# Patient Record
Sex: FEMALE | NOT HISPANIC OR LATINO | Employment: FULL TIME | URBAN - METROPOLITAN AREA
[De-identification: names, ages, dates, MRNs, and addresses within clinical notes are randomized per-mention and may not be internally consistent; named-entity substitution may affect disease eponyms.]

---
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
## 2022-05-09 ENCOUNTER — LAB (OUTPATIENT)
Dept: LAB | Facility: CLINIC | Age: 22
End: 2022-05-09

## 2022-05-09 ENCOUNTER — TELEPHONE (OUTPATIENT)
Dept: PEDIATRICS | Facility: CLINIC | Age: 22
End: 2022-05-09
Payer: COMMERCIAL

## 2022-05-09 ENCOUNTER — TELEPHONE (OUTPATIENT)
Dept: PEDIATRICS | Facility: CLINIC | Age: 22
End: 2022-05-09

## 2022-05-09 DIAGNOSIS — Z11.1 SCREENING FOR TUBERCULOSIS: Primary | ICD-10-CM

## 2022-05-09 DIAGNOSIS — Z11.1 SCREENING FOR TUBERCULOSIS: ICD-10-CM

## 2022-05-09 PROCEDURE — 86481 TB AG RESPONSE T-CELL SUSP: CPT

## 2022-05-09 PROCEDURE — 36415 COLL VENOUS BLD VENIPUNCTURE: CPT

## 2022-05-11 LAB
GAMMA INTERFERON BACKGROUND BLD IA-ACNC: 0.07 IU/ML
M TB IFN-G BLD-IMP: NEGATIVE
M TB IFN-G CD4+ BCKGRND COR BLD-ACNC: 9.93 IU/ML
MITOGEN IGNF BCKGRD COR BLD-ACNC: 0 IU/ML
MITOGEN IGNF BCKGRD COR BLD-ACNC: 0.01 IU/ML
QUANTIFERON MITOGEN: 10 IU/ML
QUANTIFERON NIL TUBE: 0.07 IU/ML
QUANTIFERON TB1 TUBE: 0.08 IU/ML
QUANTIFERON TB2 TUBE: 0.07

## 2022-09-13 ENCOUNTER — ALLIED HEALTH/NURSE VISIT (OUTPATIENT)
Dept: FAMILY MEDICINE | Facility: CLINIC | Age: 22
End: 2022-09-13
Payer: COMMERCIAL

## 2022-09-13 DIAGNOSIS — Z23 ENCOUNTER FOR IMMUNIZATION: Primary | ICD-10-CM

## 2022-09-13 PROCEDURE — 99207 PR NO CHARGE NURSE ONLY: CPT

## 2022-09-13 PROCEDURE — 90471 IMMUNIZATION ADMIN: CPT

## 2022-09-13 PROCEDURE — 90686 IIV4 VACC NO PRSV 0.5 ML IM: CPT

## 2022-09-25 ENCOUNTER — HEALTH MAINTENANCE LETTER (OUTPATIENT)
Age: 22
End: 2022-09-25

## 2023-01-26 ENCOUNTER — VIRTUAL VISIT (OUTPATIENT)
Dept: FAMILY MEDICINE | Facility: CLINIC | Age: 23
End: 2023-01-26
Payer: COMMERCIAL

## 2023-01-26 DIAGNOSIS — R10.33 UMBILICAL PAIN: Primary | ICD-10-CM

## 2023-01-26 DIAGNOSIS — R23.8 SKIN BREAKDOWN: ICD-10-CM

## 2023-01-26 PROCEDURE — 99213 OFFICE O/P EST LOW 20 MIN: CPT

## 2023-01-26 RX ORDER — MUPIROCIN 20 MG/G
OINTMENT TOPICAL 3 TIMES DAILY
Qty: 15 G | Refills: 0 | Status: SHIPPED | OUTPATIENT
Start: 2023-01-26 | End: 2024-04-18

## 2023-01-26 NOTE — PROGRESS NOTES
Nona is a 22 year old who is being evaluated via a billable video visit.      How would you like to obtain your AVS? MyChart  If the video visit is dropped, the invitation should be resent by: Text to cell phone: [phone]  Will anyone else be joining your video visit? No    Video-Visit Details    Type of service:  Video Visit   Video Start Time: 1401  Video End Time:1416    Originating Location (pt. Location): Home  Distant Location (provider location):  On-site  Platform used for Video Visit: [employer]    Problem List Items Addressed This Visit    None  Visit Diagnoses     Umbilical pain    -  Primary    Skin breakdown        Relevant Medications    mupirocin (BACTROBAN) 2 % external ointment        Discussed that my assessment of the belly button is limited due to the nature of a virtual visit. It does seem consistent with some sort of skin breakdown in the umbilicus. Bacterial vs. Fungal; more likely minor superficial bacterial infection. Suspect her repeated cleaning of the area has irritated the area. Recommend antibiotic ointment to the area 3x/day for the next couple of days. She should allow warm soapy water to run over the area in the shower, but not use any Q-tips or other devices to wipe inside the umbilicus. Can use absorbent gauze to catch any drainage. Discussed she should be assessed in person if symptoms do not improve or they worsen. Patient expressed an understanding of and agreement with the above plan. All questions were answered.    CARLIE Umana CNP on 1/26/2023 at 2:24 PM      Subjective   Nona is a 22 year old who presents for the following health issues    Chief Complaint   Patient presents with     Derm Problem     Belly button burning/itching/discharge x1 week      HPI     Ongoing for about a week.  Reports that the area was initially dry and had some flaky/clear discharge.  It then changed to a waxy/yellow appearance and and was accompanied by burning and itching to the  area.  It is now watery and clear discharge. She reports inflammation and redness to the inside of the bellybutton.  Is slightly painful when she cleans it.  She has not done anything particular for symptoms at home, other than repeated cleaning.  She does not use products baseline.  Has never had similar symptoms in the past.    Review of Systems         Objective       Vitals:  No vitals were obtained today due to virtual visit.    Physical Exam  Constitutional:       General: She is not in acute distress.     Appearance: Normal appearance.   Pulmonary:      Effort: Pulmonary effort is normal. No respiratory distress.   Skin:     Comments: Attempted to have patient bring cell phone to belly button; what I am able to visualize appears intact, but it is very limited.   Neurological:      Mental Status: She is alert.   Psychiatric:         Mood and Affect: Mood normal.         Behavior: Behavior normal.            This note has been dictated using voice recognition software. Any grammatical or context distortions are unintentional and inherent to the software

## 2023-02-26 ENCOUNTER — E-VISIT (OUTPATIENT)
Dept: FAMILY MEDICINE | Facility: CLINIC | Age: 23
End: 2023-02-26
Payer: COMMERCIAL

## 2023-02-26 DIAGNOSIS — J21.9 ACUTE BRONCHIOLITIS, UNSPECIFIED: Primary | ICD-10-CM

## 2023-02-26 PROCEDURE — 99421 OL DIG E/M SVC 5-10 MIN: CPT

## 2023-02-27 RX ORDER — BENZONATATE 100 MG/1
100-200 CAPSULE ORAL 3 TIMES DAILY PRN
Qty: 21 CAPSULE | Refills: 0 | Status: SHIPPED | OUTPATIENT
Start: 2023-02-27 | End: 2024-04-18

## 2023-02-27 RX ORDER — LEVALBUTEROL TARTRATE 45 UG/1
2 AEROSOL, METERED ORAL EVERY 4 HOURS PRN
Qty: 15 G | Refills: 0 | Status: SHIPPED | OUTPATIENT
Start: 2023-02-27 | End: 2024-04-18

## 2023-02-27 NOTE — PATIENT INSTRUCTIONS
"  Dear Nona Arnett    After reviewing your responses, I've been able to diagnose you with \"Bronchitis\" which is a common infection of your lungs that is nearly always caused by a virus. The virus causes swelling and irritation of the air passages of your lungs which leads to cough. The illness spreads from your nose and throat to your windpipe and airways. It is often called a \"chest cold\" and can last up to 2 weeks, but is not a serious illness. Exposure to cigarette smoke usually makes this significantly worse.      To treat bronchitis, the main thing to do is drink lots of fluids and rest. Cough medications over-the-counter such as mucinex, robitussin or \"cold and sinus\" medications can be helpful. I have sent in a prescription for a cough medication, Tessalon Perles, which can be taken up to three times a day for cough. I have also sent in a prescription for an albuterol inhaler that may help as well. Ibuprofen and Tylenol also help with fevers or aching feelings that you often have with this kind of illness. Do not take ibuprofen if you have kidney disease, stomach ulcers or allergy to aspirin.     Bronchitis is most often highly contagious as viruses are spread through the air or touch. Avoid contact with others who may become infected, particularly children, the elderly and those whose immune systems might be weak.     If your symptoms worsen, you develop chest pain or shortness of breath, fevers over 101, or are not improving in 5 days, please contact your primary care provider for an appointment or visit any of our convenient Walk-in Care or Urgent Care Centers to be seen which can be found on our website here. I do recommend an in person visit for lung assessment if your coughing has lasted over two weeks or you have any of the above symptoms.    Thanks again for choosing us as your health care partner,    CARLIE Umana CNP  "

## 2023-03-02 ENCOUNTER — ANCILLARY PROCEDURE (OUTPATIENT)
Dept: GENERAL RADIOLOGY | Facility: CLINIC | Age: 23
End: 2023-03-02
Attending: PEDIATRICS
Payer: COMMERCIAL

## 2023-03-02 ENCOUNTER — OFFICE VISIT (OUTPATIENT)
Dept: PEDIATRICS | Facility: CLINIC | Age: 23
End: 2023-03-02
Payer: COMMERCIAL

## 2023-03-02 VITALS
HEIGHT: 64 IN | OXYGEN SATURATION: 100 % | BODY MASS INDEX: 21.02 KG/M2 | HEART RATE: 128 BPM | DIASTOLIC BLOOD PRESSURE: 86 MMHG | WEIGHT: 123.1 LBS | TEMPERATURE: 97.6 F | SYSTOLIC BLOOD PRESSURE: 110 MMHG

## 2023-03-02 DIAGNOSIS — B34.9 VIRAL ILLNESS: Primary | ICD-10-CM

## 2023-03-02 DIAGNOSIS — R05.1 ACUTE COUGH: ICD-10-CM

## 2023-03-02 DIAGNOSIS — J18.9 ATYPICAL PNEUMONIA: ICD-10-CM

## 2023-03-02 LAB
ANION GAP SERPL CALCULATED.3IONS-SCNC: 12 MMOL/L (ref 7–15)
BASOPHILS # BLD AUTO: 0 10E3/UL (ref 0–0.2)
BASOPHILS NFR BLD AUTO: 0 %
BUN SERPL-MCNC: 6.3 MG/DL (ref 6–20)
CALCIUM SERPL-MCNC: 9.6 MG/DL (ref 8.6–10)
CHLORIDE SERPL-SCNC: 102 MMOL/L (ref 98–107)
CREAT SERPL-MCNC: 0.57 MG/DL (ref 0.51–0.95)
CRP SERPL-MCNC: 4.87 MG/L
DEPRECATED HCO3 PLAS-SCNC: 19 MMOL/L (ref 22–29)
EOSINOPHIL # BLD AUTO: 0 10E3/UL (ref 0–0.7)
EOSINOPHIL NFR BLD AUTO: 1 %
ERYTHROCYTE [DISTWIDTH] IN BLOOD BY AUTOMATED COUNT: 11.7 % (ref 10–15)
ERYTHROCYTE [SEDIMENTATION RATE] IN BLOOD BY WESTERGREN METHOD: 23 MM/HR (ref 0–20)
FLUAV AG SPEC QL IA: NEGATIVE
FLUBV AG SPEC QL IA: NEGATIVE
GFR SERPL CREATININE-BSD FRML MDRD: >90 ML/MIN/1.73M2
GLUCOSE SERPL-MCNC: 95 MG/DL (ref 70–99)
HCT VFR BLD AUTO: 39.2 % (ref 35–47)
HGB BLD-MCNC: 13.1 G/DL (ref 11.7–15.7)
IMM GRANULOCYTES # BLD: 0 10E3/UL
IMM GRANULOCYTES NFR BLD: 0 %
LYMPHOCYTES # BLD AUTO: 1.4 10E3/UL (ref 0.8–5.3)
LYMPHOCYTES NFR BLD AUTO: 20 %
MCH RBC QN AUTO: 30.9 PG (ref 26.5–33)
MCHC RBC AUTO-ENTMCNC: 33.4 G/DL (ref 31.5–36.5)
MCV RBC AUTO: 93 FL (ref 78–100)
MONOCYTES # BLD AUTO: 0.5 10E3/UL (ref 0–1.3)
MONOCYTES NFR BLD AUTO: 6 %
NEUTROPHILS # BLD AUTO: 5.1 10E3/UL (ref 1.6–8.3)
NEUTROPHILS NFR BLD AUTO: 73 %
PLATELET # BLD AUTO: 192 10E3/UL (ref 150–450)
POTASSIUM SERPL-SCNC: 4.3 MMOL/L (ref 3.4–5.3)
PROCALCITONIN SERPL IA-MCNC: 0.03 NG/ML
RBC # BLD AUTO: 4.24 10E6/UL (ref 3.8–5.2)
SODIUM SERPL-SCNC: 133 MMOL/L (ref 136–145)
WBC # BLD AUTO: 7 10E3/UL (ref 4–11)

## 2023-03-02 PROCEDURE — 71046 X-RAY EXAM CHEST 2 VIEWS: CPT | Mod: TC | Performed by: RADIOLOGY

## 2023-03-02 PROCEDURE — 84145 PROCALCITONIN (PCT): CPT | Performed by: PEDIATRICS

## 2023-03-02 PROCEDURE — 80048 BASIC METABOLIC PNL TOTAL CA: CPT | Performed by: PEDIATRICS

## 2023-03-02 PROCEDURE — U0005 INFEC AGEN DETEC AMPLI PROBE: HCPCS | Performed by: PEDIATRICS

## 2023-03-02 PROCEDURE — 99214 OFFICE O/P EST MOD 30 MIN: CPT | Mod: CS | Performed by: PEDIATRICS

## 2023-03-02 PROCEDURE — 87804 INFLUENZA ASSAY W/OPTIC: CPT | Performed by: PEDIATRICS

## 2023-03-02 PROCEDURE — 36415 COLL VENOUS BLD VENIPUNCTURE: CPT | Performed by: PEDIATRICS

## 2023-03-02 PROCEDURE — 86140 C-REACTIVE PROTEIN: CPT | Performed by: PEDIATRICS

## 2023-03-02 PROCEDURE — 85652 RBC SED RATE AUTOMATED: CPT | Performed by: PEDIATRICS

## 2023-03-02 PROCEDURE — 85025 COMPLETE CBC W/AUTO DIFF WBC: CPT | Performed by: PEDIATRICS

## 2023-03-02 PROCEDURE — U0003 INFECTIOUS AGENT DETECTION BY NUCLEIC ACID (DNA OR RNA); SEVERE ACUTE RESPIRATORY SYNDROME CORONAVIRUS 2 (SARS-COV-2) (CORONAVIRUS DISEASE [COVID-19]), AMPLIFIED PROBE TECHNIQUE, MAKING USE OF HIGH THROUGHPUT TECHNOLOGIES AS DESCRIBED BY CMS-2020-01-R: HCPCS | Performed by: PEDIATRICS

## 2023-03-02 RX ORDER — AZITHROMYCIN 250 MG/1
TABLET, FILM COATED ORAL
Qty: 6 TABLET | Refills: 0 | Status: SHIPPED | OUTPATIENT
Start: 2023-03-02 | End: 2023-03-07

## 2023-03-02 NOTE — PROGRESS NOTES
Assessment & Plan     Viral illness - question viral component as started with cough and fever, but this has progressed, see below. Influenza swab negative. CBC normal. Will follow up COVID testing pending.   - Supportive care including fluids, rest, nasal saline with gentle nose blowing, humidifier and analgesics as needed  - Cautioned against polypharmacy--currently on benzoate, interested in also starting ibuprofen and Benadryl along with prescription below. Encouraged her to discuss further with pharmacist at her pharmacy where antibiotic will be waiting.  - Influenza A/B antigen  - Symptomatic COVID-19 Virus (Coronavirus) by PCR    Atypical pneumonia - Given cough has been present and worsening for two weeks, now unable to sleep, without significant benefit from benzoate or albuterol, will treat for atypical pneumonia. CBC normal/reassuring. Will follow up electrolytes, CRP and procalcitonin.   - CBC with platelets and differential  - Basic metabolic panel  (Ca, Cl, CO2, Creat, Gluc, K, Na, BUN)  - ESR: Erythrocyte sedimentation rate  - CRP, inflammation  - Procalcitonin  - azithromycin (ZITHROMAX) 250 MG tablet  Dispense: 6 tablet; Refill: 0      Ordering of each unique test  Prescription drug management        Return in about 1 week (around 3/9/2023) for Routine preventive.    Daisy Jacobo MD  [facility]   Nona is a 22 year old, presenting for the following health issues:  Cough (x1 weeks, has been coughing up white and yellow phlem, w-visit on 2/27 was was prescribed benzonatate, only temporary help thought the day)      History of Present Illness       She eats 2-3 servings of fruits and vegetables daily.She consumes 0 sweetened beverage(s) daily.She exercises with enough effort to increase her heart rate 9 or less minutes per day.  She exercises with enough effort to increase her heart rate 3 or less days per week.   She is taking medications  "regularly.       Acute Illness  Acute illness concerns: cough  Onset/Duration: x2 week  Symptoms:  Fever: YES-  Up to 103 F last week, in the begining but not now  Chills/Sweats: YES- night sweats  Headache (location?): YES- temples  Sinus Pressure: No  Conjunctivitis:  No  Ear Pain: YES: bilateral  Rhinorrhea: YES  Congestion: YES  Sore Throat: YES - due to cough  Cough: YES, dry  Wheeze: YES  Decreased Appetite: YES  Nausea: YES  Vomiting: YES - due to cough at night  Diarrhea: No  Dysuria/Freq.: No  Dysuria or Hematuria: No  Fatigue/Achiness: YES  Sick/Strep Exposure: No  Therapies tried and outcome: benzonatate, Mucinex    First week of illness, she lost her voice and had high fever to 103 F. Fever has resolved, but still has chills and sweats. This week, the cough has been the biggest issue. She has frequent dry coughing episodes to the point of gagging. She now has abdominal muscle fatigue. No distress.  She's tried benzoate and albuterol without lasting or significant benefit.  Her appetite is down, but she's drinking okay.     Older brother had sore throat, URI but recovered quickly.    Review of Systems   Constitutional, HEENT, cardiovascular, pulmonary, gi and gu systems are negative, except as otherwise noted.      Objective    /86   Pulse (!) 128   Temp 97.6  F (36.4  C) (Oral)   Ht 5' 4.45\" (1.637 m)   Wt 123 lb 1.6 oz (55.8 kg)   LMP 02/08/2023 (Exact Date)   SpO2 100%   BMI 20.84 kg/m    Body mass index is 20.84 kg/m .  Physical Exam   GENERAL: fatigued  EYES: Eyes grossly normal to inspection, PERRL and conjunctivae and sclerae normal  HENT: ear canals and TM's normal, nose and mouth without ulcers or lesions  NECK: no adenopathy, no asymmetry, masses, or scars and thyroid normal to palpation  RESP: lungs clear to auscultation - no rales, rhonchi or wheezes  CV: tachycardia, normal S1 S2, no S3 or S4 and no murmur, click or rub  ABDOMEN: soft, nontender, no hepatosplenomegaly, no masses " and bowel sounds normal  MS: no gross musculoskeletal defects noted, no edema  LYMPH: no cervical, supraclavicular, axillary, or inguinal adenopathy    CXR - Reviewed and interpreted by me Increased interstitial markings

## 2023-03-03 LAB — SARS-COV-2 RNA RESP QL NAA+PROBE: NEGATIVE

## 2023-03-03 NOTE — RESULT ENCOUNTER NOTE
Kris Castellano,  Your labs are back and show one of your inflammatory markers as being slightly elevated. Your sodium level is a little low as well. This is likely due to a change in your water and food intake. There aren't any other lab abnormalities. I'm still waiting for your COVID result.  How are you feeling today?  Sincerely,  Bre Jacobo

## 2023-03-07 ENCOUNTER — APPOINTMENT (OUTPATIENT)
Dept: CT IMAGING | Facility: CLINIC | Age: 23
End: 2023-03-07
Attending: EMERGENCY MEDICINE
Payer: COMMERCIAL

## 2023-03-07 ENCOUNTER — HOSPITAL ENCOUNTER (EMERGENCY)
Facility: CLINIC | Age: 23
Discharge: HOME | End: 2023-03-07
Attending: EMERGENCY MEDICINE | Admitting: EMERGENCY MEDICINE
Payer: COMMERCIAL

## 2023-03-07 VITALS
HEIGHT: 65 IN | BODY MASS INDEX: 20.83 KG/M2 | WEIGHT: 125 LBS | RESPIRATION RATE: 22 BRPM | OXYGEN SATURATION: 100 % | HEART RATE: 88 BPM | TEMPERATURE: 97 F | SYSTOLIC BLOOD PRESSURE: 91 MMHG | DIASTOLIC BLOOD PRESSURE: 54 MMHG

## 2023-03-07 DIAGNOSIS — R10.84 ABDOMINAL PAIN, GENERALIZED: ICD-10-CM

## 2023-03-07 DIAGNOSIS — R11.2 NAUSEA AND VOMITING, UNSPECIFIED VOMITING TYPE: ICD-10-CM

## 2023-03-07 LAB
ALBUMIN SERPL-MCNC: 4.2 G/DL (ref 3.5–5)
ALBUMIN UR-MCNC: 20 MG/DL
ALP SERPL-CCNC: 46 U/L (ref 45–120)
ALT SERPL W P-5'-P-CCNC: 15 U/L (ref 0–45)
ANION GAP SERPL CALCULATED.3IONS-SCNC: 13 MMOL/L (ref 5–18)
APPEARANCE UR: CLEAR
AST SERPL W P-5'-P-CCNC: 15 U/L (ref 0–40)
BASOPHILS # BLD AUTO: 0 10E3/UL (ref 0–0.2)
BASOPHILS NFR BLD AUTO: 0 %
BILIRUB DIRECT SERPL-MCNC: 0.2 MG/DL
BILIRUB SERPL-MCNC: 0.7 MG/DL (ref 0–1)
BILIRUB UR QL STRIP: NEGATIVE
BUN SERPL-MCNC: 12 MG/DL (ref 8–22)
CALCIUM SERPL-MCNC: 9 MG/DL (ref 8.5–10.5)
CHLORIDE BLD-SCNC: 107 MMOL/L (ref 98–107)
CO2 SERPL-SCNC: 19 MMOL/L (ref 22–31)
COLOR UR AUTO: YELLOW
CREAT SERPL-MCNC: 0.67 MG/DL (ref 0.6–1.1)
EOSINOPHIL # BLD AUTO: 0.1 10E3/UL (ref 0–0.7)
EOSINOPHIL NFR BLD AUTO: 1 %
ERYTHROCYTE [DISTWIDTH] IN BLOOD BY AUTOMATED COUNT: 11.8 % (ref 10–15)
GFR SERPL CREATININE-BSD FRML MDRD: >90 ML/MIN/1.73M2
GLUCOSE BLD-MCNC: 104 MG/DL (ref 70–125)
GLUCOSE UR STRIP-MCNC: NEGATIVE MG/DL
HCG SERPL QL: NEGATIVE
HCT VFR BLD AUTO: 41.8 % (ref 35–47)
HGB BLD-MCNC: 14.3 G/DL (ref 11.7–15.7)
HGB UR QL STRIP: ABNORMAL
HOLD SPECIMEN: NORMAL
HYALINE CASTS: 1 /LPF
IMM GRANULOCYTES # BLD: 0 10E3/UL
IMM GRANULOCYTES NFR BLD: 0 %
KETONES UR STRIP-MCNC: ABNORMAL MG/DL
LEUKOCYTE ESTERASE UR QL STRIP: NEGATIVE
LIPASE SERPL-CCNC: 13 U/L (ref 0–52)
LYMPHOCYTES # BLD AUTO: 1.6 10E3/UL (ref 0.8–5.3)
LYMPHOCYTES NFR BLD AUTO: 14 %
MCH RBC QN AUTO: 30.7 PG (ref 26.5–33)
MCHC RBC AUTO-ENTMCNC: 34.2 G/DL (ref 31.5–36.5)
MCV RBC AUTO: 90 FL (ref 78–100)
MONOCYTES # BLD AUTO: 0.6 10E3/UL (ref 0–1.3)
MONOCYTES NFR BLD AUTO: 5 %
MUCOUS THREADS #/AREA URNS LPF: PRESENT /LPF
NEUTROPHILS # BLD AUTO: 9.4 10E3/UL (ref 1.6–8.3)
NEUTROPHILS NFR BLD AUTO: 80 %
NITRATE UR QL: NEGATIVE
NRBC # BLD AUTO: 0 10E3/UL
NRBC BLD AUTO-RTO: 0 /100
PH UR STRIP: 6 [PH] (ref 5–7)
PLATELET # BLD AUTO: 257 10E3/UL (ref 150–450)
POTASSIUM BLD-SCNC: 4 MMOL/L (ref 3.5–5)
PROT SERPL-MCNC: 8 G/DL (ref 6–8)
RBC # BLD AUTO: 4.66 10E6/UL (ref 3.8–5.2)
RBC URINE: 129 /HPF
SODIUM SERPL-SCNC: 139 MMOL/L (ref 136–145)
SP GR UR STRIP: 1.03 (ref 1–1.03)
SQUAMOUS EPITHELIAL: <1 /HPF
UROBILINOGEN UR STRIP-MCNC: <2 MG/DL
WBC # BLD AUTO: 11.7 10E3/UL (ref 4–11)
WBC URINE: 1 /HPF

## 2023-03-07 PROCEDURE — 36415 COLL VENOUS BLD VENIPUNCTURE: CPT | Performed by: EMERGENCY MEDICINE

## 2023-03-07 PROCEDURE — 250N000011 HC RX IP 250 OP 636: Performed by: EMERGENCY MEDICINE

## 2023-03-07 PROCEDURE — 250N000009 HC RX 250: Performed by: EMERGENCY MEDICINE

## 2023-03-07 PROCEDURE — 96375 TX/PRO/DX INJ NEW DRUG ADDON: CPT

## 2023-03-07 PROCEDURE — 74177 CT ABD & PELVIS W/CONTRAST: CPT

## 2023-03-07 PROCEDURE — 80053 COMPREHEN METABOLIC PANEL: CPT | Performed by: EMERGENCY MEDICINE

## 2023-03-07 PROCEDURE — 84703 CHORIONIC GONADOTROPIN ASSAY: CPT | Performed by: EMERGENCY MEDICINE

## 2023-03-07 PROCEDURE — 258N000003 HC RX IP 258 OP 636: Performed by: EMERGENCY MEDICINE

## 2023-03-07 PROCEDURE — 250N000013 HC RX MED GY IP 250 OP 250 PS 637: Performed by: EMERGENCY MEDICINE

## 2023-03-07 PROCEDURE — 81001 URINALYSIS AUTO W/SCOPE: CPT | Performed by: EMERGENCY MEDICINE

## 2023-03-07 PROCEDURE — 96361 HYDRATE IV INFUSION ADD-ON: CPT

## 2023-03-07 PROCEDURE — 83690 ASSAY OF LIPASE: CPT | Performed by: EMERGENCY MEDICINE

## 2023-03-07 PROCEDURE — 82248 BILIRUBIN DIRECT: CPT | Performed by: EMERGENCY MEDICINE

## 2023-03-07 PROCEDURE — 96374 THER/PROPH/DIAG INJ IV PUSH: CPT | Mod: 59

## 2023-03-07 PROCEDURE — 85025 COMPLETE CBC W/AUTO DIFF WBC: CPT | Performed by: EMERGENCY MEDICINE

## 2023-03-07 PROCEDURE — 99285 EMERGENCY DEPT VISIT HI MDM: CPT | Mod: 25

## 2023-03-07 RX ORDER — ONDANSETRON 2 MG/ML
4 INJECTION INTRAMUSCULAR; INTRAVENOUS ONCE
Status: COMPLETED | OUTPATIENT
Start: 2023-03-07 | End: 2023-03-07

## 2023-03-07 RX ORDER — MORPHINE SULFATE 4 MG/ML
4 INJECTION, SOLUTION INTRAMUSCULAR; INTRAVENOUS ONCE
Status: COMPLETED | OUTPATIENT
Start: 2023-03-07 | End: 2023-03-07

## 2023-03-07 RX ORDER — ONDANSETRON 4 MG/1
4 TABLET, ORALLY DISINTEGRATING ORAL EVERY 8 HOURS PRN
Qty: 15 TABLET | Refills: 0 | Status: SHIPPED | OUTPATIENT
Start: 2023-03-07 | End: 2023-03-10

## 2023-03-07 RX ORDER — IBUPROFEN 600 MG/1
600 TABLET, FILM COATED ORAL ONCE
Status: COMPLETED | OUTPATIENT
Start: 2023-03-07 | End: 2023-03-07

## 2023-03-07 RX ORDER — IOPAMIDOL 755 MG/ML
90 INJECTION, SOLUTION INTRAVASCULAR ONCE
Status: COMPLETED | OUTPATIENT
Start: 2023-03-07 | End: 2023-03-07

## 2023-03-07 RX ADMIN — IBUPROFEN 600 MG: 600 TABLET ORAL at 04:24

## 2023-03-07 RX ADMIN — ONDANSETRON 4 MG: 2 INJECTION INTRAMUSCULAR; INTRAVENOUS at 03:08

## 2023-03-07 RX ADMIN — SODIUM CHLORIDE 1000 ML: 9 INJECTION, SOLUTION INTRAVENOUS at 03:08

## 2023-03-07 RX ADMIN — ALUMINUM HYDROXIDE, MAGNESIUM HYDROXIDE, AND DIMETHICONE 30 ML: 200; 20; 200 SUSPENSION ORAL at 03:10

## 2023-03-07 RX ADMIN — IOPAMIDOL 90 ML: 755 INJECTION, SOLUTION INTRAVENOUS at 04:04

## 2023-03-07 RX ADMIN — MORPHINE SULFATE 4 MG: 4 INJECTION, SOLUTION INTRAMUSCULAR; INTRAVENOUS at 04:53

## 2023-03-07 ASSESSMENT — ACTIVITIES OF DAILY LIVING (ADL): ADLS_ACUITY_SCORE: 35

## 2023-03-07 NOTE — ED TRIAGE NOTES
Here for upper abdominal pain, pointing to epigastric area and describes as burning. Reports pain started around 20:00 last night, also having nausea and vomiting.   Rates pain 7/10. No history of abdominal surgeries      Triage Assessment     Row Name 03/07/23 0252       Triage Assessment (Adult)    Airway WDL WDL       Respiratory WDL    Respiratory WDL WDL       Skin Circulation/Temperature WDL    Skin Circulation/Temperature WDL WDL       Cardiac WDL    Cardiac WDL X;rhythm    Pulse Rate & Regularity tachycardic       Peripheral/Neurovascular WDL    Peripheral Neurovascular WDL WDL       Cognitive/Neuro/Behavioral WDL    Cognitive/Neuro/Behavioral WDL WDL       Sung Coma Scale    Best Eye Response 4-->(E4) spontaneous    Best Motor Response 6-->(M6) obeys commands    Best Verbal Response 5-->(V5) oriented    Sung Coma Scale Score 15

## 2023-03-07 NOTE — ED PROVIDER NOTES
EMERGENCY DEPARTMENT ENCOUNTER      NAME: Nona Arnett  AGE: 22 year old female  YOB: 2000  MRN: [id number]  EVALUATION DATE & TIME: 3/7/2023  2:51 AM    PCP: Claire Moreira    ED PROVIDER: Sebastián Quinones D.O.      Chief Complaint   Patient presents with     Abdominal Pain       FINAL IMPRESSION:  1. Nausea and vomiting, unspecified vomiting type    2. Abdominal pain, generalized        ED COURSE & MEDICAL DECISION MAKING:    3:00 AM I met with the patient to gather history and to perform my initial exam. I discussed the plan for care while in the Emergency Department.    4:23 AM Nurse reports that the patient is requesting ibuprofen or tylenol for their period cramps. The patient's father is also reports that the patient's mother and sister are also vomiting at home now.     4:47 AM I updated the patient with lab and imaging results and discussed the plan for discharge          Pertinent Labs & Imaging studies reviewed. (See chart for details)  22 year old female presents to the Emergency Department for evaluation of nausea and vomiting with diffuse abdominal pain.  On exam her pain is primarily in the left upper quadrant.  Differential included viral etiology, mesenteric ischemia, volvulus, bowel obstruction, diverticulitis, pancreatitis.  Less likely to represent appendicitis, cholecystitis, choledocholithiasis based on symptoms and localization of pain.  Lab testing did show a minimally elevated white count which I believe is likely secondary to her vomiting, but otherwise is largely unremarkable on this patient.  She is currently on her menstrual cycle, which does explain the blood in her urine.  No evidence of infection on the UA.  CT imaging does not show any evidence of surgical pathology, or obvious bacterial infection to include no evidence of diverticulitis, cholecystitis, choledocholithiasis, pancreatitis, or obvious evidence of appendicitis.  She did not have localized pain to the  "right lower quadrant therefore I am less concerned about the findings on CT that were unlikely to represent appendicitis, but I did inform the patient to return the emergency department if her pain was to localize in this area, worsen, or she was to develop fevers.  Symptoms today most likely represent viral etiology.  Do not believe ultrasound of the right upper quadrant is indicated based on clinical exam and lab findings.  She verbalized understand agreement this plan otherwise will follow-up with her primary care provider.  Return precautions were discussed.    Medical Decision Making    History:    Supplemental history from: Documented in chart, if applicable and Family Member/Significant Other    External Record(s) reviewed: Documented in chart, if applicable.    Work Up:    Chart documentation includes differential considered and any EKGs or imaging independently interpreted by provider, where specified.    In additional to work up documented, I considered the following work up: Documented in chart, if applicable.    External consultation:    Discussion of management with another provider: Documented in chart, if applicable    Complicating factors:    Care impacted by chronic illness: N/A    Care affected by social determinants of health: N/A    Disposition considerations: Discharge. I prescribed additional prescription strength medication(s) as charted. I considered admission, but discharged patient after significant clinical improvement.        At the conclusion of the encounter I discussed the results of all of the tests and the disposition. The questions were answered. The patient or family acknowledged understanding and was agreeable with the care plan.        HPI    Patient information was obtained from: Patient     Use of : N/A      Nona Arnett is a 22 year old female who presents with abdominal pain     The patient reports they have had a deep \"burning\" pain in their abdomen that is " "worse in the left upper quadrant since 8 PM (7 hours ago). The patient reports they also have been \"vomiting like hell\" and \"the burning wont stop\". The patient has never had pain like this before and denies any abdominal surgeries. The patient notes they finished taking antibiotics today that were prescribed for pneumonia. The patient denies diarrhea or hematemesis.   The patient denies tobacco use, alcohol use, or drug use.     REVIEW OF SYSTEMS  Constitutional:  Denies fever, chills, weight loss or weakness  Eyes:  No pain, discharge, redness  HENT:  Denies sore throat, ear pain, congestion  Respiratory: No SOB, wheeze or cough  Cardiovascular:  No CP, palpitations  GI:  Denies hematemesis, diarrhea. Positive for abdominal pain (worse in left upper quadrant) and vomiting   : Denies dysuria, hematuria  Musculoskeletal:  Denies any new muscle/joint pain, swelling or loss of function.  Skin:  Denies rash, pallor  Neurologic:  Denies headache, focal weakness or sensory changes  Lymph: Denies swollen nodes    All other systems negative unless noted in HPI.    PAST MEDICAL HISTORY:  No past medical history on file.    PAST SURGICAL HISTORY:  Past Surgical History:   Procedure Laterality Date     NO PAST SURGERIES           CURRENT MEDICATIONS:    No current facility-administered medications for this encounter.     Current Outpatient Medications   Medication     ondansetron (ZOFRAN ODT) 4 MG ODT tab     azithromycin (ZITHROMAX) 250 MG tablet     benzonatate (TESSALON) 100 MG capsule     Cholecalciferol (VITAMIN D3 PO)     levalbuterol (XOPENEX HFA) 45 MCG/ACT inhaler     mupirocin (BACTROBAN) 2 % external ointment         ALLERGIES:  No Known Allergies    FAMILY HISTORY:  Family History   Problem Relation Age of Onset     Hypothyroidism Mother      Diabetes Father      Hypertension Father      Allergies Father      Breast Cancer Maternal Grandmother      Leukemia Maternal Grandfather      Hypertension Paternal " "Grandmother      Hyperlipidemia Paternal Grandmother        SOCIAL HISTORY:  Social History     Socioeconomic History     Marital status: Single   Tobacco Use     Smoking status: Never     Passive exposure: Never     Smokeless tobacco: Never   Vaping Use     Vaping Use: Some days     Substances: Nicotine     Devices: Uses other person's occassionaly     Passive vaping exposure: Yes   Substance and Sexual Activity     Sexual activity: Never     Social Determinants of Health     Food Insecurity: No Food Insecurity     Worried About Running Out of Food in the Last Year: Never true     Ran Out of Food in the Last Year: Never true   Transportation Needs: Unknown     Lack of Transportation (Medical): No       VITALS:  Patient Vitals for the past 24 hrs:   BP Temp Temp src Pulse Resp SpO2 Height Weight   03/07/23 0458 91/54 -- -- 88 -- 100 % -- --   03/07/23 0424 -- -- -- 96 -- 99 % -- --   03/07/23 0423 -- -- -- 95 -- 100 % -- --   03/07/23 0422 -- -- -- 95 -- 100 % -- --   03/07/23 0301 -- -- -- -- -- -- 1.638 m (5' 4.5\") --   03/07/23 0250 105/58 97  F (36.1  C) Oral (!) 126 22 100 % -- 56.7 kg (125 lb)       PHYSICAL EXAM    VITAL SIGNS: BP 91/54   Pulse 88   Temp 97  F (36.1  C) (Oral)   Resp 22   Ht 1.638 m (5' 4.5\")   Wt 56.7 kg (125 lb)   LMP 03/07/2023 (Exact Date)   SpO2 100%   BMI 21.12 kg/m      General Appearance: Patient in moderate distress secondary to pain. Patient is uncomfortable appearing   Head:  Normocephalic, without obvious abnormality, atraumatic  Eyes:  PERRL, conjunctiva/corneas clear, EOM's intact,  ENT:  Lips, mucosa, and tongue normal, membranes are moist without pallor  Neck:  Normal ROM, symmetrical, trachea midline    Cardio:  Regular rate and rhythm, no murmur, rub or gallop, 2+ pulses symmetric in all extremities  Pulm:  Clear to auscultation bilaterally, respirations unlabored,  Abdomen:  Diffuse abdominal tenderness, worse in left upper quadrant   Musculoskeletal: Full ROM, no " edema, no cyanosis, good ROM of major joints  Integument:  Warm, Dry, No erythema, No rash.    Neurologic:  Alert & oriented.  No focal deficits appreciated.  Ambulatory.  Psychiatric:  Affect normal, Judgment normal, Mood normal.      LABS  Results for orders placed or performed during the hospital encounter of 03/07/23 (from the past 24 hour(s))   [place] Draw    Narrative    The following orders were created for panel order [place] Draw.  Procedure                               Abnormality         Status                     ---------                               -----------         ------                     Extra Red Top Tube[[id number]]                               Final result               Extra Green Top (Lithium...[[id number]]                      Final result               Extra Purple Top Tube[[id number]]                            Final result                 Please view results for these tests on the individual orders.   Extra Red Top Tube   Result Value Ref Range    Hold Specimen JIC    Extra Green Top (Lithium Heparin) Tube   Result Value Ref Range    Hold Specimen JIC    Extra Purple Top Tube   Result Value Ref Range    Hold Specimen JIC    CBC with platelets + differential    Narrative    The following orders were created for panel order CBC with platelets + differential.  Procedure                               Abnormality         Status                     ---------                               -----------         ------                     CBC with platelets and d...[[id number]]  Abnormal            Final result                 Please view results for these tests on the individual orders.   Basic metabolic panel   Result Value Ref Range    Sodium 139 136 - 145 mmol/L    Potassium 4.0 3.5 - 5.0 mmol/L    Chloride 107 98 - 107 mmol/L    Carbon Dioxide (CO2) 19 (L) 22 - 31 mmol/L    Anion Gap 13 5 - 18 mmol/L    Urea Nitrogen 12 8 - 22 mg/dL    Creatinine 0.67 0.60 - 1.10 mg/dL    Calcium 9.0 8.5 -  10.5 mg/dL    Glucose 104 70 - 125 mg/dL    GFR Estimate >90 >60 mL/min/1.73m2   Hepatic function panel   Result Value Ref Range    Bilirubin Total 0.7 0.0 - 1.0 mg/dL    Bilirubin Direct 0.2 <=0.5 mg/dL    Protein Total 8.0 6.0 - 8.0 g/dL    Albumin 4.2 3.5 - 5.0 g/dL    Alkaline Phosphatase 46 45 - 120 U/L    AST 15 0 - 40 U/L    ALT 15 0 - 45 U/L   Lipase   Result Value Ref Range    Lipase 13 0 - 52 U/L   CBC with platelets and differential   Result Value Ref Range    WBC Count 11.7 (H) 4.0 - 11.0 10e3/uL    RBC Count 4.66 3.80 - 5.20 10e6/uL    Hemoglobin 14.3 11.7 - 15.7 g/dL    Hematocrit 41.8 35.0 - 47.0 %    MCV 90 78 - 100 fL    MCH 30.7 26.5 - 33.0 pg    MCHC 34.2 31.5 - 36.5 g/dL    RDW 11.8 10.0 - 15.0 %    Platelet Count 257 150 - 450 10e3/uL    % Neutrophils 80 %    % Lymphocytes 14 %    % Monocytes 5 %    % Eosinophils 1 %    % Basophils 0 %    % Immature Granulocytes 0 %    NRBCs per 100 WBC 0 <1 /100    Absolute Neutrophils 9.4 (H) 1.6 - 8.3 10e3/uL    Absolute Lymphocytes 1.6 0.8 - 5.3 10e3/uL    Absolute Monocytes 0.6 0.0 - 1.3 10e3/uL    Absolute Eosinophils 0.1 0.0 - 0.7 10e3/uL    Absolute Basophils 0.0 0.0 - 0.2 10e3/uL    Absolute Immature Granulocytes 0.0 <=0.4 10e3/uL    Absolute NRBCs 0.0 10e3/uL   HCG QUALitative pregnancy (blood)   Result Value Ref Range    hCG Serum Qualitative Negative Negative   UA with Microscopic reflex to Culture    Specimen: Urine, Clean Catch   Result Value Ref Range    Color Urine Yellow Colorless, Straw, Light Yellow, Yellow    Appearance Urine Clear Clear    Glucose Urine Negative Negative mg/dL    Bilirubin Urine Negative Negative    Ketones Urine Trace (A) Negative mg/dL    Specific Gravity Urine 1.032 (H) 1.001 - 1.030    Blood Urine 1.0 mg/dL (A) Negative    pH Urine 6.0 5.0 - 7.0    Protein Albumin Urine 20 (A) Negative mg/dL    Urobilinogen Urine <2.0 <2.0 mg/dL    Nitrite Urine Negative Negative    Leukocyte Esterase Urine Negative Negative    Mucus  Urine Present (A) None Seen /LPF    RBC Urine 129 (H) <=2 /HPF    WBC Urine 1 <=5 /HPF    Squamous Epithelials Urine <1 <=1 /HPF    Hyaline Casts Urine 1 <=2 /LPF    Narrative    Urine Culture not indicated   CT Abdomen Pelvis w Contrast    Narrative    EXAM: CT ABDOMEN PELVIS W CONTRAST  LOCATION: [facility]  DATE/TIME: 3/7/2023 4:16 AM    INDICATION: Diffuse abdominal pain  COMPARISON: None.  TECHNIQUE: CT scan of the abdomen and pelvis was performed following injection of IV contrast. Multiplanar reformats were obtained. Dose reduction techniques were used.  CONTRAST: Isovue 370 90ml    FINDINGS:   LOWER CHEST: Lung bases clear.    HEPATOBILIARY: Subcentimeter hepatic hypodensity small for characterization.    PANCREAS: Normal.    SPLEEN: Normal.    ADRENAL GLANDS: Normal.    KIDNEYS/BLADDER: Normal.    BOWEL: Fluid-filled small bowel and partially fluid-filled colon. The appendix is fluid-filled and slightly prominent proximally but there is no surrounding inflammation.    LYMPH NODES: Normal.    VASCULATURE: Unremarkable.    PELVIC ORGANS: Normal.    MUSCULOSKELETAL: Normal.      Impression    IMPRESSION:   1.  Fluid-filled small bowel and partially fluid-filled colon which can be seen with gastroenteritis.  2.  The appendix is prominent in size proximally and fluid-filled. No surrounding inflammation. If there is clinical concern for evolving appendicitis then short-term follow-up suggested.         RADIOLOGY  CT Abdomen Pelvis w Contrast   Final Result   IMPRESSION:    1.  Fluid-filled small bowel and partially fluid-filled colon which can be seen with gastroenteritis.   2.  The appendix is prominent in size proximally and fluid-filled. No surrounding inflammation. If there is clinical concern for evolving appendicitis then short-term follow-up suggested.               MEDICATIONS GIVEN IN THE EMERGENCY:  Medications   lidocaine (viscous) (XYLOCAINE) 2 % 15 mL, alum & mag  hydroxide-simethicone (MAALOX) 15 mL GI Cocktail (30 mLs Oral $Given 3/7/23 0310)   ondansetron (ZOFRAN) injection 4 mg (4 mg Intravenous $Given 3/7/23 0308)   0.9% sodium chloride BOLUS (0 mLs Intravenous Stopped 3/7/23 0356)   iopamidol (ISOVUE-370) solution 90 mL (90 mLs Intravenous $Given 3/7/23 0404)   ibuprofen (ADVIL/MOTRIN) tablet 600 mg (600 mg Oral $Given 3/7/23 0424)   morphine (PF) injection 4 mg (4 mg Intravenous $Given 3/7/23 0453)       NEW PRESCRIPTIONS STARTED AT TODAY'S ER VISIT  Discharge Medication List as of 3/7/2023  4:56 AM      START taking these medications    Details   ondansetron (ZOFRAN ODT) 4 MG ODT tab Take 1 tablet (4 mg) by mouth every 8 hours as needed for nausea or vomiting, Disp-15 tablet, R-0, Local Print              I, Rhina Durant, am serving as a scribe to document services personally performed by Dr. Quinones based on my observation and the provider's statements to me. I, Sebastián Quinones, DO attest that Rhina Durant is acting in a scribe capacity, has observed my performance of the services and has documented them in accordance with my direction.    Sebastián Quinones D.O.  Emergency Medicine  [facility] EMERGENCY ROOM  [address] [id number] [phone]  Dept: [phone]     Sebastián Quinones DO  03/07/23 1289

## 2024-04-18 ENCOUNTER — OFFICE VISIT (OUTPATIENT)
Dept: FAMILY MEDICINE | Facility: CLINIC | Age: 24
End: 2024-04-18

## 2024-04-18 ENCOUNTER — ANCILLARY PROCEDURE (OUTPATIENT)
Dept: GENERAL RADIOLOGY | Facility: CLINIC | Age: 24
End: 2024-04-18
Attending: FAMILY MEDICINE
Payer: COMMERCIAL

## 2024-04-18 VITALS
OXYGEN SATURATION: 98 % | TEMPERATURE: 98 F | HEART RATE: 85 BPM | SYSTOLIC BLOOD PRESSURE: 114 MMHG | RESPIRATION RATE: 16 BRPM | DIASTOLIC BLOOD PRESSURE: 75 MMHG

## 2024-04-18 DIAGNOSIS — S93.402A SPRAIN OF LEFT ANKLE, UNSPECIFIED LIGAMENT, INITIAL ENCOUNTER: ICD-10-CM

## 2024-04-18 DIAGNOSIS — S99.912A ANKLE INJURY, LEFT, INITIAL ENCOUNTER: ICD-10-CM

## 2024-04-18 DIAGNOSIS — S99.912A ANKLE INJURY, LEFT, INITIAL ENCOUNTER: Primary | ICD-10-CM

## 2024-04-18 PROCEDURE — 99213 OFFICE O/P EST LOW 20 MIN: CPT | Performed by: FAMILY MEDICINE

## 2024-04-18 PROCEDURE — 73610 X-RAY EXAM OF ANKLE: CPT | Mod: TC | Performed by: RADIOLOGY

## 2024-04-18 RX ORDER — IBUPROFEN 200 MG
800 TABLET ORAL EVERY 4 HOURS PRN
COMMUNITY

## 2024-04-18 NOTE — PROGRESS NOTES
Assessment:       Ankle injury, left, initial encounter  - XR Ankle Left G/E 3 Views    Sprain of left ankle, unspecified ligament, initial encounter    Plan:     Left ankle x-ray ordered and reviewed by myself showing no evidence of fracture or other significant abnormality that I can appreciate.  Rest, ice, elevation, compression.  Follow-up if symptoms getting worse or not improving as expected over the next 2 weeks with PCP.    MEDICATIONS:   Orders Placed This Encounter   Medications    ibuprofen (ADVIL/MOTRIN) 200 MG tablet     Sig: Take 800 mg by mouth every 4 hours as needed for pain     Subjective:       23 year old female presents for evaluation of a left ankle injury that she sustained yesterday when she stepped in a hole twisting her left ankle while she was at work.  She has been able to weight-bear but having significant pain.  She has a history of an ankle sprain in that ankle.  It has been swollen and she has been using ice on it but still continues to give her a lot of pain and is now here today for further evaluation.    Patient Active Problem List   Diagnosis    Acne vulgaris    Anorexia       No past medical history on file.    Past Surgical History:   Procedure Laterality Date    NO PAST SURGERIES         Current Outpatient Medications   Medication Sig Dispense Refill    ibuprofen (ADVIL/MOTRIN) 200 MG tablet Take 800 mg by mouth every 4 hours as needed for pain      benzonatate (TESSALON) 100 MG capsule Take 1-2 capsules (100-200 mg) by mouth 3 times daily as needed for cough (Patient not taking: Reported on 4/18/2024) 21 capsule 0    Cholecalciferol (VITAMIN D3 PO) Take by mouth daily (Patient not taking: Reported on 1/26/2023)      levalbuterol (XOPENEX HFA) 45 MCG/ACT inhaler Inhale 2 puffs into the lungs every 4 hours as needed for shortness of breath or wheezing (Patient not taking: Reported on 4/18/2024) 15 g 0    mupirocin (BACTROBAN) 2 % external ointment Apply topically 3 times daily  Can substitute for cream based on patient's formulary (Patient not taking: Reported on 3/2/2023) 15 g 0     No current facility-administered medications for this visit.       No Known Allergies    Family History   Problem Relation Age of Onset    Hypothyroidism Mother     Diabetes Father     Hypertension Father     Allergies Father     Breast Cancer Maternal Grandmother     Leukemia Maternal Grandfather     Hypertension Paternal Grandmother     Hyperlipidemia Paternal Grandmother        Social History     Socioeconomic History    Marital status: Single     Spouse name: None    Number of children: None    Years of education: None    Highest education level: None   Tobacco Use    Smoking status: Never     Passive exposure: Never    Smokeless tobacco: Never   Vaping Use    Vaping status: Some Days    Substances: Nicotine    Devices: Uses other person's occassionaly    Passive vaping exposure: Yes   Substance and Sexual Activity    Sexual activity: Never     Social Determinants of Health     Financial Resource Strain: Not At Risk (2/10/2024)    Received from [employer]    Financial Resource Strain     Is it hard for you to pay for the very basics like food, housing, medical care or heating?: No   Food Insecurity: Not At Risk (2/10/2024)    Received from [employer]    Food Insecurity     Does your food run out before you have the money to buy more?: No   Transportation Needs: Not At Risk (2/10/2024)    Received from [employer]    Transportation Needs     Does a lack of transportation keep you from your medical appointments or from getting your medications?: No   Physical Activity: Insufficiently Active (8/12/2021)    Exercise Vital Sign     Days of Exercise per Week: 1 day     Minutes of Exercise per Session: 30 min   Housing Stability: Unknown (8/12/2021)    Housing Stability Vital Sign     Unable to Pay for Housing in the Last Year: No     Unstable Housing in the Last Year: No         Review of  Systems  Pertinent items are noted in HPI.      Objective:     /75   Pulse 85   Temp 98  F (36.7  C)   Resp 16   LMP 04/18/2024   SpO2 98%      General appearance: alert, appears stated age, and cooperative  Extremities: Patient with tenderness over the lateral malleolus as well as some swelling and ecchymosis present.  She has tenderness with inversion of her ankle.  No other tenderness of the foot.  No deformity noted.      Results for orders placed or performed in visit on 04/18/24   XR Ankle Left G/E 3 Views     Status: None    Narrative    EXAM: XR ANKLE LEFT G/E 3 VIEWS  LOCATION: [facility]  DATE: 4/18/2024    INDICATION: Pain since ankle injury, left, initial encounter  COMPARISON: None.      Impression    IMPRESSION: Normal joint spaces and alignment. No fracture.       This note has been dictated using voice recognition software. Any grammatical or context distortions are unintentional and inherent to the software

## 2024-04-18 NOTE — LETTER
April 18, 2024      Nona Arnett  [address] [id number]        To Whom It May Concern:    Nona Arnett  was seen on 4/18/2024.  Please excuse her  until 4/25/2024 due to injury.  She may then return to full duty.        Sincerely,        Jessie Fung MD

## 2024-04-25 ENCOUNTER — NURSE TRIAGE (OUTPATIENT)
Dept: NURSING | Facility: CLINIC | Age: 24
End: 2024-04-25
Payer: COMMERCIAL

## 2024-04-26 NOTE — TELEPHONE ENCOUNTER
Nurse Triage SBAR    Is this a 2nd Level Triage? NO    Situation: Diarrhea    Background: Has had continuous diarrhea for 3 days now. She also had vomiting which has since resolved. Reports having diarrhea about every hour. Has abdominal cramping. Has had black stools but noticed that after taking pepto-bismol. Patient thinks it could be food poisoning from pizza she had at [employer].     Assessment: Denies severe abdominal pain, fever or blood in stools. Does report dry mouth but no severe lightheadedness and still having urine output every 12 hours.    Protocol Recommended Disposition:   See PCP Within 24 Hours    Recommendation: See PCP within 24 hours. Home care advice given for diarrhea. Patient advised to stop taking pepto bismol since it is not helping and is turning her stools black which makes it more difficult to watch for blood in stools. Recommended trying imodium instead and frequent sips of water or pedialyte. Patient advised on red flag symptoms that would indicate need to go to ED.       Reason for Disposition   [1] SEVERE diarrhea (e.g., 7 or more times / day more than normal) AND [2] present > 24 hours (1 day)    Additional Information   Negative: Shock suspected (e.g., cold/pale/clammy skin, too weak to stand, low BP, rapid pulse)   Negative: Difficult to awaken or acting confused (e.g., disoriented, slurred speech)   Negative: Sounds like a life-threatening emergency to the triager   Negative: [1] SEVERE abdominal pain (e.g., excruciating) AND [2] present > 1 hour   Negative: [1] SEVERE abdominal pain AND [2] age > 60 years   Negative: [1] Blood in the stool AND [2] moderate or large amount of blood   Negative: Black or tarry bowel movements  (Exception: Chronic-unchanged black-grey BMs AND is taking iron pills or Pepto-Bismol.)     Has been taking pepto bismol   Negative: [1] Drinking very little AND [2] dehydration suspected (e.g., no urine > 12 hours, very dry mouth, very lightheaded)   Negative:  Patient sounds very sick or weak to the triager   Negative: [1] SEVERE diarrhea (e.g., 7 or more times / day more than normal) AND [2] age > 60 years   Negative: [1] Constant abdominal pain AND [2] present > 2 hours   Negative: [1] Fever > 103 F (39.4 C) AND [2] not able to get the fever down using Fever Care Advice    Protocols used: Diarrhea-A-AH

## 2024-05-11 ENCOUNTER — HEALTH MAINTENANCE LETTER (OUTPATIENT)
Age: 24
End: 2024-05-11

## 2025-02-05 ENCOUNTER — HOSPITAL ENCOUNTER (EMERGENCY)
Facility: CLINIC | Age: 25
Discharge: HOME | End: 2025-02-05
Admitting: PHYSICIAN ASSISTANT
Payer: COMMERCIAL

## 2025-02-05 VITALS
RESPIRATION RATE: 18 BRPM | HEART RATE: 74 BPM | TEMPERATURE: 98.1 F | OXYGEN SATURATION: 99 % | DIASTOLIC BLOOD PRESSURE: 58 MMHG | SYSTOLIC BLOOD PRESSURE: 108 MMHG

## 2025-02-05 DIAGNOSIS — S61.512A LACERATION OF LEFT WRIST, INITIAL ENCOUNTER: ICD-10-CM

## 2025-02-05 DIAGNOSIS — R42 LIGHTHEADEDNESS: ICD-10-CM

## 2025-02-05 DIAGNOSIS — R55 SYNCOPE: ICD-10-CM

## 2025-02-05 LAB
ATRIAL RATE - MUSE: 74 BPM
DIASTOLIC BLOOD PRESSURE - MUSE: NORMAL MMHG
GLUCOSE BLDC GLUCOMTR-MCNC: 83 MG/DL (ref 70–99)
INTERPRETATION ECG - MUSE: NORMAL
P AXIS - MUSE: 76 DEGREES
PR INTERVAL - MUSE: 150 MS
QRS DURATION - MUSE: 76 MS
QT - MUSE: 390 MS
QTC - MUSE: 432 MS
R AXIS - MUSE: 82 DEGREES
SYSTOLIC BLOOD PRESSURE - MUSE: NORMAL MMHG
T AXIS - MUSE: 69 DEGREES
VENTRICULAR RATE- MUSE: 74 BPM

## 2025-02-05 PROCEDURE — 93005 ELECTROCARDIOGRAM TRACING: CPT | Performed by: PHYSICIAN ASSISTANT

## 2025-02-05 PROCEDURE — 12001 RPR S/N/AX/GEN/TRNK 2.5CM/<: CPT

## 2025-02-05 PROCEDURE — 82962 GLUCOSE BLOOD TEST: CPT

## 2025-02-05 PROCEDURE — 99284 EMERGENCY DEPT VISIT MOD MDM: CPT

## 2025-02-05 ASSESSMENT — COLUMBIA-SUICIDE SEVERITY RATING SCALE - C-SSRS
2. HAVE YOU ACTUALLY HAD ANY THOUGHTS OF KILLING YOURSELF IN THE PAST MONTH?: NO
6. HAVE YOU EVER DONE ANYTHING, STARTED TO DO ANYTHING, OR PREPARED TO DO ANYTHING TO END YOUR LIFE?: NO
1. IN THE PAST MONTH, HAVE YOU WISHED YOU WERE DEAD OR WISHED YOU COULD GO TO SLEEP AND NOT WAKE UP?: NO

## 2025-02-05 NOTE — Clinical Note
Nona Arnett was seen and treated in our emergency department on 2/5/2025.  She may return to work on 02/07/2025.       If you have any questions or concerns, please don't hesitate to call.      Madisyn Cruz PA-C

## 2025-02-06 NOTE — ED PROVIDER NOTES
"EMERGENCY DEPARTMENT ENCOUNTER      NAME: Nona Arnett  AGE: 24 year old female  YOB: 2000  MRN: [id number]  EVALUATION DATE & TIME: No admission date for patient encounter.    PCP: Layla Warren    ED PROVIDER: Madisyn Cruz PA-C      Chief Complaint   Patient presents with    Laceration         FINAL IMPRESSION:  1. Syncope    2. Lightheadedness    3. Laceration of left wrist, initial encounter          ED COURSE & MEDICAL DECISION MAKIN:42 PM I introduced myself to patient, performed initial HPI and examination.   7:15 PM Laceration repaired.   7:29 PM Patient no longer wants blood drawn. Does report recent bloodwork at [facility] 2 days ago. Agrees to POC glucose and plan for discharge. Does feel better now.   7:32 PM POC glucose WNL. Patient appropriate for discharge.     Nona Arnett is a 24 year old female who presents to the emergency department today for evaluation of syncope, lightheadedness. Was feeling fine, had just eaten dinner when cleaning dishes she broke a plate and subsequently sustained laceration to left wrist. Felt lightheaded, syncopal episode caught by father. Reports she has been persistently lightheaded and \"tingling all over\" since incident. No history of syncope. Has been working a string of nights and not sleeping much, otherwise no associated triggers.     Regarding laceration: 0.5 cm superficial laceration to left volar wrist without tendon or muscle involvement. Distal CMS intact. Tdap up to date (2018). Offered suture repair (anticipated 1 suture needed) but patient reports fear of needles. Ultimately shared medical decision making to repair with dermabond/surgical glue. No evidence of foreign body. No deeper injury, no indication for x-ray at this time. Clean laceration, no indication for antibiotics.     Episode of syncope most consistent with vagal as she was feeling well prior to event, lightheadedness occurred after injury. However it " has been persistent. Thus, EKG and labs obtained:    -EKG without ischemia or arrhythmia.  -POC glucose unremarkable. Patient drinking orange juice in the ED.   -Labs from [facility] 1/[facility] reviewed: Vitamin D low (15), labs otherwise WNL without any leukocytosis, significant anemia, electrolyte derangement or LIZBETH. No pregnancy checked, but patient denies chance of pregnancy.     Work up is reassuring. Initial plan for basic labs, patient declines. Reviewed recent labs with no concerns. Patient does report symptoms improving.   Instructed on at home wound/symptom management. Instructed on close follow up and red flags/indications to return to the emergency department. All questions were answered to the best of my ability and patient/family are agreeable with plan.         Medical Decision Making  Obtained supplemental history:Supplemental history obtained?: Documented in chart and Family Member/Significant Other  Reviewed external records: External records reviewed?: Documented in chart  Care impacted by chronic illness:Other: cervicogenic headache  Care significantly affected by social determinants of health:N/A  Did you consider but not order tests?: Work up considered but not performed and documented in chart, if applicable  Did you interpret images independently?: Independent interpretation of ECG and images noted in documentation, when applicable.  Consultation discussion with other provider:Did you involve another provider (consultant, , pharmacy, etc.)?: No  Discharge. No recommendations on prescription strength medication(s). See documentation for any additional details.  Not Applicable        MEDICATIONS GIVEN IN THE EMERGENCY:  Medications - No data to display    NEW PRESCRIPTIONS STARTED AT TODAY'S ER VISIT  Discharge Medication List as of 2/5/2025  7:32 PM             =================================================================    HPI    Patient information was obtained from: patient, family  "member    Use of : N/A         Nona Arnett is a 24 year old female with a pertinent history of cervicogenic headache who presents to this ED for evaluation of laceration.    Patient reports she was doing the dishes when a plate slip and broke into pieces causing a laceration to her left wrist. Following this, she reports a syncopal episode. Currently, she endorses a cold sensation in her hand and tingling \"all over,\" and lightheadedness. Denies any lightheadedness, dizziness, or pain prior to the event. Patient reports she did eat dinner prior to the event. No history of syncope except one time when she had a syncopal episode and obtained labs for this. 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 Patient is right hand dominant. Denies any history of DM, heart disorders or arrhythmias. No chance of pregnancy.  Last Td/Tdap was 8/16/2018. No other complaints or concerns at this time.     REVIEW OF SYSTEMS   ROS negative unless otherwise stated in HPI    PAST MEDICAL HISTORY:  No past medical history on file.    PAST SURGICAL HISTORY:  Past Surgical History:   Procedure Laterality Date    NO PAST SURGERIES         CURRENT MEDICATIONS:    ibuprofen (ADVIL/MOTRIN) 200 MG tablet        ALLERGIES:  No Known Allergies    FAMILY HISTORY:  Family History   Problem Relation Age of Onset    Hypothyroidism Mother     Diabetes Father     Hypertension Father     Allergies Father     Breast Cancer Maternal Grandmother     Leukemia Maternal Grandfather     Hypertension Paternal Grandmother     Hyperlipidemia Paternal Grandmother        SOCIAL HISTORY:   Social History     Socioeconomic History    Marital status: Single   Tobacco Use    Smoking status: Never     Passive exposure: Never    Smokeless tobacco: Never   Vaping Use    Vaping status: Some Days    Substances: Nicotine    Devices: Uses other person's occassionaly    Passive vaping " exposure: Yes   Substance and Sexual Activity    Sexual activity: Never     Social Drivers of Health     Financial Resource Strain: Not At Risk (2/10/2024)    Received from [employer]    Financial Resource Strain     Is it hard for you to pay for the very basics like food, housing, medical care or heating?: No   Food Insecurity: Not At Risk (2/10/2024)    Received from [employer]    Food Insecurity     Does your food run out before you have the money to buy more?: No   Transportation Needs: Not At Risk (2/10/2024)    Received from [employer]    Transportation Needs     Does a lack of transportation keep you from your medical appointments or from getting your medications?: No   Physical Activity: Insufficiently Active (8/12/2021)    Exercise Vital Sign     Days of Exercise per Week: 1 day     Minutes of Exercise per Session: 30 min   Housing Stability: Unknown (8/12/2021)    Housing Stability Vital Sign     Unable to Pay for Housing in the Last Year: No     Unstable Housing in the Last Year: No       VITALS:  /58   Pulse 74   Temp 98.1  F (36.7  C) (Temporal)   Resp 18   SpO2 99%     PHYSICAL EXAM    Constitutional: Well developed, Well nourished, NAD, GCS 15   HENT: Normocephalic, Atraumatic  Neck- Supple, Nontender. Normal ROM.   Eyes: Conjunctiva normal. PERRL. EOM intact. No nystagmus or photophobia  Respiratory: No respiratory distress, speaking in full sentences. Normal breath sounds, No wheezing  Cardiovascular: Normal heart rate, Regular rhythm, No murmurs.  Pulses WNL.   GI: Soft, nontender  Musculoskeletal: No deformities, Moves all extremities equally.  Full ROM right hand and wrist.   Integument: 0.5 cm superficial laceration to volar left wrist, no active bleeding. No tendon or bone involvement. No foreign body.   Neurologic: Alert & oriented x 3, Normal sensory function. No focal deficits.   Psychiatric: Affect normal, Judgment normal, Mood normal. Cooperative.      LAB:  All  pertinent labs reviewed and interpreted.  Results for orders placed or performed during the hospital encounter of 02/05/25   Glucose by meter   Result Value Ref Range    GLUCOSE BY METER POCT 83 70 - 99 mg/dL   ECG 12-LEAD WITH MUSE ([facility])   Result Value Ref Range    Systolic Blood Pressure  mmHg    Diastolic Blood Pressure  mmHg    Ventricular Rate 74 BPM    Atrial Rate 74 BPM    TN Interval 150 ms    QRS Duration 76 ms     ms    QTc 432 ms    P Axis 76 degrees    R AXIS 82 degrees    T Axis 69 degrees    Interpretation ECG       Sinus rhythm  Normal ECG  When compared with ECG of 12-Mar-2021 14:10,  No significant change was found  Confirmed by SEE ED PROVIDER NOTE FOR, ECG INTERPRETATION ([id number]),  CADE NOVAK ([id number]) on 2/5/2025 7:09:58 PM         RADIOLOGY:  Reviewed all pertinent imaging. Please see official radiology report.  No orders to display       EKG:    Performed at: 19:03:22    Impression: Sinus rhythm, Normal ECG    Rate: 74 BPM  TN Interval: 150 ms  QRS Interval: 76 ms  QTc Interval: 390/432 ms  ST Changes: None  Comparison: When compared with ECG of 12-Mar-2021 14:10 No significant change was found    Dr. Rojas and I have independently reviewed and interpreted the EKG(s) documented above.    PROCEDURES:   PROCEDURE: Laceration Repair   INDICATIONS: Laceration   PROCEDURE PROVIDER: KACI Perez, Supervised by Madisyn Goodwin PA-C   SITE: Left volar wrist   TYPE/SIZE: simple, clean, and no foreign body visualized  0.5 cm (total length)   FUNCTIONAL ASSESSMENT: Distal sensation, circulation, motor, and tendon function intact   MEDICATION: None   PREPARATION: scrubbing and irrigation with Warm soapy water   DEBRIDEMENT: wound explored, no foreign body found   CLOSURE:  Superficial layer closed with Dermabond (medical glue)    Total number of sutures/staples placed: N/A             IBeth am serving as a scribe to document services personally performed by Madisyn  LULU Cruz based on my observation and the provider's statements to me. I, Madisyn Cruz PA-C, attest that Beth Price is acting in a scribe capacity, has observed my performance of the services and has documented them in accordance with my direction.    Madisyn Cruz PA-C  Emergency Medicine  [facility] EMERGENCY ROOM  [facility] [facility] [id number] [phone]     Madisyn Cruz PA-C  02/05/25 1950

## 2025-02-06 NOTE — DISCHARGE INSTRUCTIONS
Use tylenol and ibuprofen as needed for pain.   You can use ice as well    Make sure you are drinking plenty of fluids to stay hydrated.  Make sure you are getting enough sleep, eating frequent meals/snacks to try to help with lightheadedness.  Follow up in clinic on Friday if you are still having symptoms.  Return to the emergency department if you develop any new/worsening symptoms. We would be happy to see you.

## 2025-02-06 NOTE — ED TRIAGE NOTES
Pt presents to the ED with laceration to L wrist from washing dishes. Pt had a syncopal episode after getting cut. 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 Father there to catch pt. Did not hit head.     Triage Assessment (Adult)       Row Name 02/05/25 1829          Triage Assessment    Airway WDL WDL        Respiratory WDL    Respiratory WDL WDL        Skin Circulation/Temperature WDL    Skin Circulation/Temperature WDL X        Cardiac WDL    Cardiac WDL WDL        Peripheral/Neurovascular WDL    Peripheral Neurovascular WDL WDL        Cognitive/Neuro/Behavioral WDL    Cognitive/Neuro/Behavioral WDL WDL

## 2025-03-15 ENCOUNTER — HEALTH MAINTENANCE LETTER (OUTPATIENT)
Age: 25
End: 2025-03-15